# Patient Record
Sex: FEMALE | Race: WHITE
[De-identification: names, ages, dates, MRNs, and addresses within clinical notes are randomized per-mention and may not be internally consistent; named-entity substitution may affect disease eponyms.]

---

## 2020-12-30 ENCOUNTER — HOSPITAL ENCOUNTER (OUTPATIENT)
Dept: HOSPITAL 79 - MRI | Age: 60
End: 2020-12-30
Attending: NEUROLOGICAL SURGERY
Payer: MEDICARE

## 2020-12-30 DIAGNOSIS — M50.122: ICD-10-CM

## 2020-12-30 DIAGNOSIS — M51.24: ICD-10-CM

## 2020-12-30 DIAGNOSIS — M48.04: ICD-10-CM

## 2020-12-30 DIAGNOSIS — M47.22: Primary | ICD-10-CM

## 2020-12-30 DIAGNOSIS — S22.020A: ICD-10-CM

## 2020-12-30 PROCEDURE — A9577 INJ MULTIHANCE: HCPCS

## 2021-04-27 ENCOUNTER — HOSPITAL ENCOUNTER (OUTPATIENT)
Dept: HOSPITAL 79 - LAB | Age: 61
End: 2021-04-27
Attending: NURSE PRACTITIONER
Payer: MEDICARE

## 2021-04-27 DIAGNOSIS — E11.9: ICD-10-CM

## 2021-04-27 DIAGNOSIS — E55.9: ICD-10-CM

## 2021-04-27 DIAGNOSIS — I10: Primary | ICD-10-CM

## 2021-04-27 DIAGNOSIS — Z79.899: ICD-10-CM

## 2021-04-27 LAB
BUN/CREATININE RATIO: 11 (ref 0–10)
HGB BLD-MCNC: 11.3 GM/DL (ref 12.3–15.3)
RED BLOOD COUNT: 3.52 M/UL (ref 4–5.1)
WHITE BLOOD COUNT: 5.2 K/UL (ref 4.5–11)

## 2021-05-12 ENCOUNTER — HOSPITAL ENCOUNTER (OUTPATIENT)
Dept: HOSPITAL 79 - LAB | Age: 61
End: 2021-05-12
Attending: NURSE PRACTITIONER
Payer: MEDICARE

## 2021-05-12 DIAGNOSIS — E87.1: ICD-10-CM

## 2021-05-12 DIAGNOSIS — E87.5: Primary | ICD-10-CM

## 2021-05-12 LAB — BUN/CREATININE RATIO: 12 (ref 0–10)

## 2021-06-10 ENCOUNTER — HOSPITAL ENCOUNTER (OUTPATIENT)
Dept: HOSPITAL 79 - KOH-I | Age: 61
End: 2021-06-10
Attending: ORTHOPAEDIC SURGERY
Payer: MEDICARE

## 2021-06-10 DIAGNOSIS — M47.24: ICD-10-CM

## 2021-06-10 DIAGNOSIS — M47.26: ICD-10-CM

## 2021-06-10 DIAGNOSIS — M81.0: Primary | ICD-10-CM

## 2021-06-10 DIAGNOSIS — M51.16: ICD-10-CM

## 2021-07-13 ENCOUNTER — HOSPITAL ENCOUNTER (OUTPATIENT)
Dept: HOSPITAL 79 - KOH-I | Age: 61
End: 2021-07-13
Attending: ORTHOPAEDIC SURGERY
Payer: MEDICARE

## 2021-07-13 DIAGNOSIS — Z98.1: ICD-10-CM

## 2021-07-13 DIAGNOSIS — M48.07: ICD-10-CM

## 2021-07-13 DIAGNOSIS — M54.16: Primary | ICD-10-CM

## 2021-07-13 DIAGNOSIS — M48.061: ICD-10-CM

## 2021-08-25 ENCOUNTER — HOSPITAL ENCOUNTER (OUTPATIENT)
Dept: HOSPITAL 79 - ECHO | Age: 61
End: 2021-08-25
Attending: INTERNAL MEDICINE
Payer: MEDICARE

## 2021-08-25 DIAGNOSIS — I20.9: ICD-10-CM

## 2021-08-25 DIAGNOSIS — R06.00: Primary | ICD-10-CM

## 2021-08-25 DIAGNOSIS — R07.89: ICD-10-CM

## 2021-08-27 ENCOUNTER — HOSPITAL ENCOUNTER (OUTPATIENT)
Dept: HOSPITAL 79 - LAB | Age: 61
End: 2021-08-27
Attending: NURSE PRACTITIONER
Payer: MEDICARE

## 2021-08-27 DIAGNOSIS — E87.1: ICD-10-CM

## 2021-08-27 DIAGNOSIS — E83.42: ICD-10-CM

## 2021-08-27 DIAGNOSIS — I10: Primary | ICD-10-CM

## 2021-08-27 DIAGNOSIS — Z79.899: ICD-10-CM

## 2021-08-27 DIAGNOSIS — E55.9: ICD-10-CM

## 2021-08-27 DIAGNOSIS — E11.9: ICD-10-CM

## 2021-08-27 LAB
BUN/CREATININE RATIO: 14 (ref 0–10)
HGB BLD-MCNC: 12 GM/DL (ref 12.3–15.3)
RED BLOOD COUNT: 3.66 M/UL (ref 4–5.1)
WHITE BLOOD COUNT: 7 K/UL (ref 4.5–11)

## 2021-08-30 ENCOUNTER — HOSPITAL ENCOUNTER (OUTPATIENT)
Dept: HOSPITAL 79 - HEART 5 | Age: 61
End: 2021-08-30
Attending: NURSE PRACTITIONER
Payer: MEDICARE

## 2021-08-30 DIAGNOSIS — S99.921A: ICD-10-CM

## 2021-08-30 DIAGNOSIS — Z01.818: Primary | ICD-10-CM

## 2021-08-30 DIAGNOSIS — R06.00: ICD-10-CM

## 2021-09-08 ENCOUNTER — HOSPITAL ENCOUNTER (OUTPATIENT)
Dept: HOSPITAL 79 - LAB | Age: 61
End: 2021-09-08
Attending: NURSE PRACTITIONER
Payer: MEDICARE

## 2021-09-08 DIAGNOSIS — E87.1: Primary | ICD-10-CM

## 2021-09-08 LAB — BUN/CREATININE RATIO: 13 (ref 0–10)

## 2021-10-07 ENCOUNTER — HOSPITAL ENCOUNTER (OUTPATIENT)
Dept: HOSPITAL 79 - LAB | Age: 61
End: 2021-10-07
Attending: INTERNAL MEDICINE
Payer: MEDICARE

## 2021-10-07 DIAGNOSIS — E87.1: Primary | ICD-10-CM

## 2021-10-07 DIAGNOSIS — E78.1: ICD-10-CM

## 2021-10-07 LAB — BUN/CREATININE RATIO: 13 (ref 0–10)

## 2021-10-18 ENCOUNTER — HOSPITAL ENCOUNTER (OUTPATIENT)
Dept: HOSPITAL 79 - US | Age: 61
End: 2021-10-18
Attending: INTERNAL MEDICINE
Payer: MEDICARE

## 2021-10-18 DIAGNOSIS — E87.1: Primary | ICD-10-CM

## 2021-10-18 DIAGNOSIS — I82.401: ICD-10-CM

## 2021-10-18 LAB — BUN/CREATININE RATIO: 11 (ref 0–10)

## 2021-10-26 ENCOUNTER — HOSPITAL ENCOUNTER (OUTPATIENT)
Dept: HOSPITAL 79 - OPSV2 | Age: 61
End: 2021-10-26
Attending: ORTHOPAEDIC SURGERY
Payer: MEDICARE

## 2021-10-26 DIAGNOSIS — Z01.818: Primary | ICD-10-CM

## 2021-10-26 DIAGNOSIS — R94.31: ICD-10-CM

## 2021-10-26 DIAGNOSIS — M43.16: ICD-10-CM

## 2021-10-26 DIAGNOSIS — J98.11: ICD-10-CM

## 2021-10-26 LAB
BUN/CREATININE RATIO: 13 (ref 0–10)
HGB BLD-MCNC: 11.1 GM/DL (ref 12.3–15.3)
RED BLOOD COUNT: 3.42 M/UL (ref 4–5.1)
WHITE BLOOD COUNT: 4.8 K/UL (ref 4.5–11)

## 2021-10-31 ENCOUNTER — HOSPITAL ENCOUNTER (OUTPATIENT)
Dept: HOSPITAL 79 - LAB | Age: 61
End: 2021-10-31
Attending: ORTHOPAEDIC SURGERY
Payer: MEDICARE

## 2021-10-31 DIAGNOSIS — Z01.812: Primary | ICD-10-CM

## 2021-10-31 DIAGNOSIS — M48.8X9: ICD-10-CM

## 2021-10-31 LAB — BUN/CREATININE RATIO: 15 (ref 0–10)

## 2021-10-31 PROCEDURE — P9016 RBC LEUKOCYTES REDUCED: HCPCS

## 2021-10-31 PROCEDURE — P9017 PLASMA 1 DONOR FRZ W/IN 8 HR: HCPCS

## 2021-11-01 ENCOUNTER — HOSPITAL ENCOUNTER (INPATIENT)
Dept: HOSPITAL 79 - OR | Age: 61
LOS: 8 days | Discharge: HOME HEALTH | DRG: 453 | End: 2021-11-09
Attending: ORTHOPAEDIC SURGERY | Admitting: ORTHOPAEDIC SURGERY
Payer: MEDICARE

## 2021-11-01 VITALS — WEIGHT: 205 LBS | BODY MASS INDEX: 35 KG/M2 | HEIGHT: 64 IN

## 2021-11-01 DIAGNOSIS — Z56.0: ICD-10-CM

## 2021-11-01 DIAGNOSIS — D62: ICD-10-CM

## 2021-11-01 DIAGNOSIS — G89.4: ICD-10-CM
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
## 2021-11-01 DIAGNOSIS — Z96.698: ICD-10-CM

## 2021-11-01 DIAGNOSIS — I95.9: ICD-10-CM

## 2021-11-01 DIAGNOSIS — E87.5: ICD-10-CM

## 2021-11-01 DIAGNOSIS — M81.0: ICD-10-CM

## 2021-11-01 DIAGNOSIS — E11.51: ICD-10-CM

## 2021-11-01 DIAGNOSIS — E87.2: ICD-10-CM

## 2021-11-01 DIAGNOSIS — Z87.01: ICD-10-CM

## 2021-11-01 DIAGNOSIS — M43.16: Primary | ICD-10-CM

## 2021-11-01 DIAGNOSIS — M06.9: ICD-10-CM

## 2021-11-01 DIAGNOSIS — M48.061: ICD-10-CM

## 2021-11-01 DIAGNOSIS — F17.290: ICD-10-CM

## 2021-11-01 DIAGNOSIS — N18.2: ICD-10-CM

## 2021-11-01 DIAGNOSIS — N17.9: ICD-10-CM

## 2021-11-01 DIAGNOSIS — Z82.61: ICD-10-CM

## 2021-11-01 DIAGNOSIS — T84.296A: ICD-10-CM

## 2021-11-01 DIAGNOSIS — Z87.442: ICD-10-CM

## 2021-11-01 DIAGNOSIS — E78.5: ICD-10-CM

## 2021-11-01 DIAGNOSIS — Z20.822: ICD-10-CM

## 2021-11-01 DIAGNOSIS — Z98.1: ICD-10-CM

## 2021-11-01 DIAGNOSIS — E11.22: ICD-10-CM

## 2021-11-01 DIAGNOSIS — K75.81: ICD-10-CM

## 2021-11-01 DIAGNOSIS — Z82.62: ICD-10-CM

## 2021-11-01 DIAGNOSIS — I13.10: ICD-10-CM

## 2021-11-01 DIAGNOSIS — E87.1: ICD-10-CM

## 2021-11-01 DIAGNOSIS — D63.1: ICD-10-CM

## 2021-11-01 DIAGNOSIS — Z79.4: ICD-10-CM

## 2021-11-01 DIAGNOSIS — Z83.3: ICD-10-CM

## 2021-11-01 DIAGNOSIS — Z95.1: ICD-10-CM

## 2021-11-01 DIAGNOSIS — K21.9: ICD-10-CM

## 2021-11-01 DIAGNOSIS — R57.1: ICD-10-CM

## 2021-11-01 DIAGNOSIS — M54.16: ICD-10-CM

## 2021-11-01 DIAGNOSIS — K74.60: ICD-10-CM

## 2021-11-01 DIAGNOSIS — F32.A: ICD-10-CM

## 2021-11-01 DIAGNOSIS — N30.00: ICD-10-CM

## 2021-11-01 DIAGNOSIS — K59.00: ICD-10-CM

## 2021-11-01 DIAGNOSIS — Z88.0: ICD-10-CM

## 2021-11-01 LAB
BUN/CREATININE RATIO: 15 (ref 0–10)
HGB BLD-MCNC: 11.5 GM/DL (ref 12.3–15.3)
RED BLOOD COUNT: 3.51 M/UL (ref 4–5.1)
WHITE BLOOD COUNT: 10.4 K/UL (ref 4.5–11)

## 2021-11-01 PROCEDURE — 01NB0ZZ RELEASE LUMBAR NERVE, OPEN APPROACH: ICD-10-PCS | Performed by: ORTHOPAEDIC SURGERY

## 2021-11-01 PROCEDURE — 30233L1 TRANSFUSION OF NONAUTOLOGOUS FRESH PLASMA INTO PERIPHERAL VEIN, PERCUTANEOUS APPROACH: ICD-10-PCS | Performed by: ORTHOPAEDIC SURGERY

## 2021-11-01 PROCEDURE — C1713 ANCHOR/SCREW BN/BN,TIS/BN: HCPCS

## 2021-11-01 PROCEDURE — C1762 CONN TISS, HUMAN(INC FASCIA): HCPCS

## 2021-11-01 PROCEDURE — P9017 PLASMA 1 DONOR FRZ W/IN 8 HR: HCPCS

## 2021-11-01 PROCEDURE — C1781 MESH (IMPLANTABLE): HCPCS

## 2021-11-01 PROCEDURE — 0SG0071 FUSION OF LUMBAR VERTEBRAL JOINT WITH AUTOLOGOUS TISSUE SUBSTITUTE, POSTERIOR APPROACH, POSTERIOR COLUMN, OPEN APPROACH: ICD-10-PCS | Performed by: ORTHOPAEDIC SURGERY

## 2021-11-01 PROCEDURE — 30233N1 TRANSFUSION OF NONAUTOLOGOUS RED BLOOD CELLS INTO PERIPHERAL VEIN, PERCUTANEOUS APPROACH: ICD-10-PCS | Performed by: ORTHOPAEDIC SURGERY

## 2021-11-01 PROCEDURE — P9016 RBC LEUKOCYTES REDUCED: HCPCS

## 2021-11-01 PROCEDURE — P9047 ALBUMIN (HUMAN), 25%, 50ML: HCPCS

## 2021-11-01 PROCEDURE — 0SP004Z REMOVAL OF INTERNAL FIXATION DEVICE FROM LUMBAR VERTEBRAL JOINT, OPEN APPROACH: ICD-10-PCS | Performed by: ORTHOPAEDIC SURGERY

## 2021-11-01 PROCEDURE — 0SG307J FUSION OF LUMBOSACRAL JOINT WITH AUTOLOGOUS TISSUE SUBSTITUTE, POSTERIOR APPROACH, ANTERIOR COLUMN, OPEN APPROACH: ICD-10-PCS | Performed by: ORTHOPAEDIC SURGERY

## 2021-11-01 SDOH — ECONOMIC STABILITY - INCOME SECURITY: UNEMPLOYMENT, UNSPECIFIED: Z56.0

## 2021-11-02 LAB
BUN/CREATININE RATIO: 13 (ref 0–10)
BUN/CREATININE RATIO: 14 (ref 0–10)
BUN/CREATININE RATIO: 16 (ref 0–10)
BUN/CREATININE RATIO: 17 (ref 0–10)
BUN/CREATININE RATIO: 18 (ref 0–10)
HGB BLD-MCNC: 10 GM/DL (ref 12.3–15.3)
RED BLOOD COUNT: 3.15 M/UL (ref 4–5.1)
WHITE BLOOD COUNT: 13.9 K/UL (ref 4.5–11)

## 2021-11-03 LAB
BUN/CREATININE RATIO: 13 (ref 0–10)
HGB BLD-MCNC: 7.9 GM/DL (ref 12.3–15.3)
RED BLOOD COUNT: 2.43 M/UL (ref 4–5.1)
WHITE BLOOD COUNT: 7.6 K/UL (ref 4.5–11)

## 2021-11-04 LAB
BUN/CREATININE RATIO: 13 (ref 0–10)
HGB BLD-MCNC: 7.3 GM/DL (ref 12.3–15.3)
RED BLOOD COUNT: 2.21 M/UL (ref 4–5.1)
WHITE BLOOD COUNT: 4.9 K/UL (ref 4.5–11)

## 2021-11-05 LAB
BUN/CREATININE RATIO: 8 (ref 0–10)
HGB BLD-MCNC: 8 GM/DL (ref 12.3–15.3)
RED BLOOD COUNT: 2.57 M/UL (ref 4–5.1)
WHITE BLOOD COUNT: 5.9 K/UL (ref 4.5–11)

## 2021-11-06 LAB
BUN/CREATININE RATIO: 12 (ref 0–10)
HGB BLD-MCNC: 9.5 GM/DL (ref 12.3–15.3)
RED BLOOD COUNT: 2.9 M/UL (ref 4–5.1)
WHITE BLOOD COUNT: 6 K/UL (ref 4.5–11)

## 2021-11-07 LAB — BUN/CREATININE RATIO: 18 (ref 0–10)

## 2021-11-08 LAB
HGB BLD-MCNC: 9.3 GM/DL (ref 12.3–15.3)
RED BLOOD COUNT: 2.92 M/UL (ref 4–5.1)
WHITE BLOOD COUNT: 6.8 K/UL (ref 4.5–11)

## 2021-11-09 LAB
HGB BLD-MCNC: 8.9 GM/DL (ref 12.3–15.3)
RED BLOOD COUNT: 2.78 M/UL (ref 4–5.1)
WHITE BLOOD COUNT: 6.5 K/UL (ref 4.5–11)

## 2021-11-09 NOTE — NUR
1415 Unable to determine what pain medication patient was being discharged
     with. Attempted to reach Dr Carr without succcess. Spoke with patient,
     she said he had sent her prescription to [facility] because she had called
     to check.

## 2021-11-18 ENCOUNTER — HOSPITAL ENCOUNTER (INPATIENT)
Dept: HOSPITAL 79 - ER1 | Age: 61
LOS: 33 days | Discharge: NURSING, REHAB OR LONG-TERM CARE | DRG: 856 | End: 2021-12-21
Attending: INTERNAL MEDICINE | Admitting: INTERNAL MEDICINE
Payer: MEDICARE

## 2021-11-18 VITALS — HEIGHT: 62.99 IN | WEIGHT: 220.46 LBS | BODY MASS INDEX: 39.06 KG/M2

## 2021-11-18 DIAGNOSIS — L02.212: ICD-10-CM

## 2021-11-18 DIAGNOSIS — K74.60: ICD-10-CM

## 2021-11-18 DIAGNOSIS — N18.9: ICD-10-CM

## 2021-11-18 DIAGNOSIS — T81.49XA: Primary | ICD-10-CM

## 2021-11-18 DIAGNOSIS — Z20.822: ICD-10-CM

## 2021-11-18 DIAGNOSIS — E66.9: ICD-10-CM

## 2021-11-18 DIAGNOSIS — Z95.1: ICD-10-CM

## 2021-11-18 DIAGNOSIS — M06.9: ICD-10-CM

## 2021-11-18 DIAGNOSIS — J18.9: ICD-10-CM

## 2021-11-18 DIAGNOSIS — Z79.899: ICD-10-CM

## 2021-11-18 DIAGNOSIS — E87.2: ICD-10-CM

## 2021-11-18 DIAGNOSIS — S32.10XA: ICD-10-CM

## 2021-11-18 DIAGNOSIS — Z88.0: ICD-10-CM

## 2021-11-18 DIAGNOSIS — E11.51: ICD-10-CM

## 2021-11-18 DIAGNOSIS — D50.9: ICD-10-CM

## 2021-11-18 DIAGNOSIS — Z79.84: ICD-10-CM

## 2021-11-18 DIAGNOSIS — I12.9: ICD-10-CM

## 2021-11-18 DIAGNOSIS — E87.1: ICD-10-CM

## 2021-11-18 DIAGNOSIS — I25.10: ICD-10-CM

## 2021-11-18 DIAGNOSIS — N30.90: ICD-10-CM

## 2021-11-18 DIAGNOSIS — A41.51: ICD-10-CM

## 2021-11-18 DIAGNOSIS — B96.20: ICD-10-CM

## 2021-11-18 DIAGNOSIS — G89.29: ICD-10-CM

## 2021-11-18 DIAGNOSIS — J80: ICD-10-CM

## 2021-11-18 DIAGNOSIS — R65.21: ICD-10-CM

## 2021-11-18 DIAGNOSIS — E87.5: ICD-10-CM

## 2021-11-18 DIAGNOSIS — N17.0: ICD-10-CM

## 2021-11-18 DIAGNOSIS — G92.8: ICD-10-CM

## 2021-11-18 DIAGNOSIS — Z83.3: ICD-10-CM

## 2021-11-18 DIAGNOSIS — E11.22: ICD-10-CM

## 2021-11-18 DIAGNOSIS — I73.9: ICD-10-CM

## 2021-11-18 DIAGNOSIS — R53.81: ICD-10-CM

## 2021-11-18 DIAGNOSIS — K76.0: ICD-10-CM

## 2021-11-18 DIAGNOSIS — E11.65: ICD-10-CM

## 2021-11-18 LAB
BUN/CREATININE RATIO: 10 (ref 0–10)
HGB BLD-MCNC: 8.5 GM/DL (ref 12.3–15.3)
RED BLOOD COUNT: 2.68 M/UL (ref 4–5.1)
WHITE BLOOD COUNT: 15.5 K/UL (ref 4.5–11)

## 2021-11-18 PROCEDURE — C1713 ANCHOR/SCREW BN/BN,TIS/BN: HCPCS

## 2021-11-18 PROCEDURE — U0002 COVID-19 LAB TEST NON-CDC: HCPCS

## 2021-11-18 PROCEDURE — P9045 ALBUMIN (HUMAN), 5%, 250 ML: HCPCS

## 2021-11-18 PROCEDURE — C1751 CATH, INF, PER/CENT/MIDLINE: HCPCS

## 2021-11-18 PROCEDURE — P9016 RBC LEUKOCYTES REDUCED: HCPCS

## 2021-11-18 PROCEDURE — A9577 INJ MULTIHANCE: HCPCS

## 2021-11-19 LAB
BUN/CREATININE RATIO: 11 (ref 0–10)
BUN/CREATININE RATIO: 12 (ref 0–10)
BUN/CREATININE RATIO: 12 (ref 0–10)
ESCHERICHIA COLI: DETECTED
HGB BLD-MCNC: 8.2 GM/DL (ref 12.3–15.3)
HGB BLD-MCNC: 8.7 GM/DL (ref 12.3–15.3)
RED BLOOD COUNT: 2.59 M/UL (ref 4–5.1)
RED BLOOD COUNT: 2.8 M/UL (ref 4–5.1)
WHITE BLOOD COUNT: 13.4 K/UL (ref 4.5–11)
WHITE BLOOD COUNT: 9.9 K/UL (ref 4.5–11)

## 2021-11-19 PROCEDURE — 3E033XZ INTRODUCTION OF VASOPRESSOR INTO PERIPHERAL VEIN, PERCUTANEOUS APPROACH: ICD-10-PCS | Performed by: ORTHOPAEDIC SURGERY

## 2021-11-19 PROCEDURE — 0S900ZZ DRAINAGE OF LUMBAR VERTEBRAL JOINT, OPEN APPROACH: ICD-10-PCS | Performed by: ORTHOPAEDIC SURGERY

## 2021-11-19 PROCEDURE — 30233N1 TRANSFUSION OF NONAUTOLOGOUS RED BLOOD CELLS INTO PERIPHERAL VEIN, PERCUTANEOUS APPROACH: ICD-10-PCS | Performed by: INTERNAL MEDICINE

## 2021-11-19 NOTE — NUR
1200, FAMILY NOTIFIED FOR CONSENT AND ALSO TOLD THAT PATIENT WILL BE
TRANSFERRED TO ICU POST SURGERY

## 2021-11-20 LAB
BUN/CREATININE RATIO: 13 (ref 0–10)
BUN/CREATININE RATIO: 14 (ref 0–10)
HGB BLD-MCNC: 8.1 GM/DL (ref 12.3–15.3)
RED BLOOD COUNT: 2.59 M/UL (ref 4–5.1)
WHITE BLOOD COUNT: 8.7 K/UL (ref 4.5–11)

## 2021-11-21 LAB
BUN/CREATININE RATIO: 24 (ref 0–10)
HGB BLD-MCNC: 8.4 GM/DL (ref 12.3–15.3)
RED BLOOD COUNT: 2.7 M/UL (ref 4–5.1)
WHITE BLOOD COUNT: 13.1 K/UL (ref 4.5–11)

## 2021-11-22 LAB
BUN/CREATININE RATIO: 33 (ref 0–10)
HGB BLD-MCNC: 8.1 GM/DL (ref 12.3–15.3)
RED BLOOD COUNT: 2.68 M/UL (ref 4–5.1)
WHITE BLOOD COUNT: 9.1 K/UL (ref 4.5–11)

## 2021-11-23 LAB
BUN/CREATININE RATIO: 34 (ref 0–10)
HGB BLD-MCNC: 7.6 GM/DL (ref 12.3–15.3)
RED BLOOD COUNT: 2.6 M/UL (ref 4–5.1)
WHITE BLOOD COUNT: 8.6 K/UL (ref 4.5–11)

## 2021-11-24 LAB
BUN/CREATININE RATIO: 31 (ref 0–10)
HGB BLD-MCNC: 8.3 GM/DL (ref 12.3–15.3)
RED BLOOD COUNT: 2.72 M/UL (ref 4–5.1)
WHITE BLOOD COUNT: 8.5 K/UL (ref 4.5–11)

## 2021-11-25 LAB — BUN/CREATININE RATIO: 24 (ref 0–10)

## 2021-11-25 NOTE — NUR
DR DURAN CHANGED PATIENT KEISHA DRAINS TODAY. SHE NOW HAS 2 SMALL BOXES WHICH
ARE IN HER GOWN POCKET. DRESSING CLEAN DRY AND INTACT. NURSING TO EMPTY AND
RECORD DRAINS EVERY 12 HOURS. PATIENT TOLERATED WELL. WILL CONTINUE TO MONITOR

## 2021-11-26 LAB
BUN/CREATININE RATIO: 22 (ref 0–10)
HGB BLD-MCNC: 9.3 GM/DL (ref 12.3–15.3)
RED BLOOD COUNT: 3.05 M/UL (ref 4–5.1)
WHITE BLOOD COUNT: 10.7 K/UL (ref 4.5–11)

## 2021-11-28 LAB
BUN/CREATININE RATIO: 13 (ref 0–10)
HGB BLD-MCNC: 8.8 GM/DL (ref 12.3–15.3)
RED BLOOD COUNT: 2.87 M/UL (ref 4–5.1)
WHITE BLOOD COUNT: 8.5 K/UL (ref 4.5–11)

## 2021-11-29 PROCEDURE — 02HV33Z INSERTION OF INFUSION DEVICE INTO SUPERIOR VENA CAVA, PERCUTANEOUS APPROACH: ICD-10-PCS | Performed by: INTERNAL MEDICINE

## 2021-11-30 LAB
BUN/CREATININE RATIO: 10 (ref 0–10)
HGB BLD-MCNC: 9.9 GM/DL (ref 12.3–15.3)
RED BLOOD COUNT: 3.28 M/UL (ref 4–5.1)
WHITE BLOOD COUNT: 6.9 K/UL (ref 4.5–11)

## 2021-12-02 LAB
BUN/CREATININE RATIO: 11 (ref 0–10)
HGB BLD-MCNC: 9.2 GM/DL (ref 12.3–15.3)
RED BLOOD COUNT: 3 M/UL (ref 4–5.1)
WHITE BLOOD COUNT: 6.8 K/UL (ref 4.5–11)

## 2021-12-04 LAB
BUN/CREATININE RATIO: 14 (ref 0–10)
HGB BLD-MCNC: 9.4 GM/DL (ref 12.3–15.3)
RED BLOOD COUNT: 3.11 M/UL (ref 4–5.1)
WHITE BLOOD COUNT: 5.2 K/UL (ref 4.5–11)

## 2021-12-04 NOTE — NUR
1430
 
PATIENT REFUSED TO LET NURSE ATTEMPT IV ACCESS UNTIL THIS TIME. #22G IV
INSERTED TO RIGHT WRIST.

## 2021-12-04 NOTE — NUR
0830
 
ATTEMPTED IV ACCESS X2 WITH NO SUCCESS. PATIENT BECAME UPSET AND REQUESTED
THAT SHE NOT GET "POKED" FOR A "WHILE". DR LIVINGSTON MADE AWARE OF PATIENT PICC
LINE BEING ACCIDENTALLY PULLED OUT BY PATIENT, THEN D/C'D BY NURSE.

## 2021-12-05 LAB
BUN/CREATININE RATIO: 13 (ref 0–10)
HGB BLD-MCNC: 8.8 GM/DL (ref 12.3–15.3)
RED BLOOD COUNT: 2.85 M/UL (ref 4–5.1)
WHITE BLOOD COUNT: 5.2 K/UL (ref 4.5–11)

## 2021-12-06 LAB
BUN/CREATININE RATIO: 13 (ref 0–10)
HGB BLD-MCNC: 8.4 GM/DL (ref 12.3–15.3)
RED BLOOD COUNT: 2.86 M/UL (ref 4–5.1)
WHITE BLOOD COUNT: 5 K/UL (ref 4.5–11)

## 2021-12-07 LAB
BUN/CREATININE RATIO: 16 (ref 0–10)
BUN/CREATININE RATIO: 16 (ref 0–10)
HGB BLD-MCNC: 10 GM/DL (ref 12.3–15.3)
HGB BLD-MCNC: 9.3 GM/DL (ref 12.3–15.3)
RED BLOOD COUNT: 3.05 M/UL (ref 4–5.1)
RED BLOOD COUNT: 3.3 M/UL (ref 4–5.1)
WHITE BLOOD COUNT: 4.8 K/UL (ref 4.5–11)
WHITE BLOOD COUNT: 7.4 K/UL (ref 4.5–11)

## 2021-12-07 PROCEDURE — 0S900ZZ DRAINAGE OF LUMBAR VERTEBRAL JOINT, OPEN APPROACH: ICD-10-PCS | Performed by: ORTHOPAEDIC SURGERY

## 2021-12-08 LAB
BUN/CREATININE RATIO: 17 (ref 0–10)
HGB BLD-MCNC: 9.1 GM/DL (ref 12.3–15.3)
RED BLOOD COUNT: 3.02 M/UL (ref 4–5.1)
WHITE BLOOD COUNT: 5 K/UL (ref 4.5–11)

## 2021-12-08 PROCEDURE — 5A0955A ASSISTANCE WITH RESPIRATORY VENTILATION, GREATER THAN 96 CONSECUTIVE HOURS, HIGH NASAL FLOW/VELOCITY: ICD-10-PCS | Performed by: INTERNAL MEDICINE

## 2021-12-09 LAB — BUN/CREATININE RATIO: 17 (ref 0–10)

## 2021-12-10 LAB — BUN/CREATININE RATIO: 19 (ref 0–10)

## 2021-12-11 LAB
BUN/CREATININE RATIO: 19 (ref 0–10)
HGB BLD-MCNC: 7.9 GM/DL (ref 12.3–15.3)
RED BLOOD COUNT: 2.61 M/UL (ref 4–5.1)
WHITE BLOOD COUNT: 4.4 K/UL (ref 4.5–11)

## 2021-12-12 LAB — BUN/CREATININE RATIO: 18 (ref 0–10)

## 2021-12-13 LAB
BUN/CREATININE RATIO: 18 (ref 0–10)
HGB BLD-MCNC: 8.5 GM/DL (ref 12.3–15.3)
RED BLOOD COUNT: 2.78 M/UL (ref 4–5.1)
WHITE BLOOD COUNT: 5 K/UL (ref 4.5–11)

## 2021-12-14 NOTE — NUR
SPOKE WITH PT RE: PICC LINE PLACEMENT.  PT HAS HAD A PICC LINE IN THE RECENT
PAST, AND STATED THAT IT FELL OUT.  RIGHT IJ TLC IN PLACE AT PRESENT.  PT
WANTS TO SPEAK WITH MD PRIOR TO HAVING PICC PLACED.  WILL READDRESS ON 12/15.

## 2021-12-15 LAB — BUN/CREATININE RATIO: 15 (ref 0–10)

## 2021-12-16 LAB
BUN/CREATININE RATIO: 18 (ref 0–10)
HGB BLD-MCNC: 9.2 GM/DL (ref 12.3–15.3)
RED BLOOD COUNT: 3.05 M/UL (ref 4–5.1)
WHITE BLOOD COUNT: 5.8 K/UL (ref 4.5–11)

## 2021-12-16 NOTE — NUR
SINGLE LUMEN PICC LINE PLACED IN THE RIGHT BASILIC VEIN, CUT TO 38 CM.  US
GUIDANCE USED TO OBTAIN VENOUS ACCESS.  3CG TECHNOLOGY USED TO CONFIRM PICC
TIP IN SVC.  STERILE TECHNIQUE USED THROUGHOUT CASE.  STERILE DRESSING APPLIED
AND DATED.  ASPIRATES AND FLUSHES EASILY.  UPPER ARM CIRC = 31 CM, LOWER ARM
CIRC = 28.5 CM.  NO COMPLICATIONS.
 
ORIGINAL PICC LINE PULLED PER PT APPROXIMATELY 2 WEEKS AGO. PICC PLACEMENT FOR
D/C TODAY.

## 2021-12-20 LAB
BUN/CREATININE RATIO: 16 (ref 0–10)
HGB BLD-MCNC: 8.8 GM/DL (ref 12.3–15.3)
RED BLOOD COUNT: 2.88 M/UL (ref 4–5.1)
WHITE BLOOD COUNT: 6.5 K/UL (ref 4.5–11)

## 2022-05-25 ENCOUNTER — HOSPITAL ENCOUNTER (OUTPATIENT)
Dept: HOSPITAL 79 - LAB | Age: 62
End: 2022-05-25
Attending: NURSE PRACTITIONER
Payer: MEDICARE

## 2022-05-25 DIAGNOSIS — Z79.899: ICD-10-CM

## 2022-05-25 DIAGNOSIS — I10: ICD-10-CM

## 2022-05-25 DIAGNOSIS — E78.5: Primary | ICD-10-CM

## 2022-05-25 DIAGNOSIS — E55.9: ICD-10-CM

## 2022-05-25 DIAGNOSIS — E11.9: ICD-10-CM

## 2022-05-25 LAB
BUN/CREATININE RATIO: 20 (ref 0–10)
HGB BLD-MCNC: 12.4 GM/DL (ref 12.3–15.3)
RED BLOOD COUNT: 3.79 M/UL (ref 4–5.1)
WHITE BLOOD COUNT: 4.9 K/UL (ref 4.5–11)